# Patient Record
Sex: MALE | Race: WHITE
[De-identification: names, ages, dates, MRNs, and addresses within clinical notes are randomized per-mention and may not be internally consistent; named-entity substitution may affect disease eponyms.]

---

## 2019-07-04 ENCOUNTER — HOSPITAL ENCOUNTER (INPATIENT)
Dept: HOSPITAL 92 - ERS | Age: 84
LOS: 2 days | Discharge: HOME | DRG: 194 | End: 2019-07-06
Attending: FAMILY MEDICINE | Admitting: FAMILY MEDICINE
Payer: MEDICARE

## 2019-07-04 VITALS — BODY MASS INDEX: 21.6 KG/M2

## 2019-07-04 DIAGNOSIS — I12.9: ICD-10-CM

## 2019-07-04 DIAGNOSIS — R91.8: ICD-10-CM

## 2019-07-04 DIAGNOSIS — F02.80: ICD-10-CM

## 2019-07-04 DIAGNOSIS — Z87.891: ICD-10-CM

## 2019-07-04 DIAGNOSIS — J15.9: Primary | ICD-10-CM

## 2019-07-04 DIAGNOSIS — G30.9: ICD-10-CM

## 2019-07-04 DIAGNOSIS — D69.6: ICD-10-CM

## 2019-07-04 DIAGNOSIS — N18.9: ICD-10-CM

## 2019-07-04 DIAGNOSIS — J43.9: ICD-10-CM

## 2019-07-04 DIAGNOSIS — D63.1: ICD-10-CM

## 2019-07-04 DIAGNOSIS — N40.0: ICD-10-CM

## 2019-07-04 DIAGNOSIS — N17.9: ICD-10-CM

## 2019-07-04 LAB
ALBUMIN SERPL BCG-MCNC: 3.6 G/DL (ref 3.4–4.8)
ALP SERPL-CCNC: 72 U/L (ref 40–150)
ALT SERPL W P-5'-P-CCNC: 9 U/L (ref 8–55)
ANION GAP SERPL CALC-SCNC: 13 MMOL/L (ref 10–20)
AST SERPL-CCNC: 12 U/L (ref 5–34)
BASOPHILS # BLD AUTO: 0 THOU/UL (ref 0–0.2)
BASOPHILS NFR BLD AUTO: 0.3 % (ref 0–1)
BILIRUB SERPL-MCNC: 0.5 MG/DL (ref 0.2–1.2)
BUN SERPL-MCNC: 26 MG/DL (ref 8.4–25.7)
CALCIUM SERPL-MCNC: 8.9 MG/DL (ref 7.8–10.44)
CHLORIDE SERPL-SCNC: 107 MMOL/L (ref 98–107)
CK SERPL-CCNC: 62 U/L (ref 30–200)
CO2 SERPL-SCNC: 24 MMOL/L (ref 23–31)
CREAT CL PREDICTED SERPL C-G-VRATE: 0 ML/MIN (ref 70–130)
EOSINOPHIL # BLD AUTO: 0.1 THOU/UL (ref 0–0.7)
EOSINOPHIL NFR BLD AUTO: 2.6 % (ref 0–10)
GLOBULIN SER CALC-MCNC: 2.4 G/DL (ref 2.4–3.5)
GLUCOSE SERPL-MCNC: 111 MG/DL (ref 83–110)
HGB BLD-MCNC: 10.7 G/DL (ref 14–18)
IRON SERPL-MCNC: 45 UG/DL (ref 65–175)
LYMPHOCYTES # BLD: 0.7 THOU/UL (ref 1.2–3.4)
LYMPHOCYTES NFR BLD AUTO: 19.1 % (ref 21–51)
MCH RBC QN AUTO: 31.5 PG (ref 27–31)
MCV RBC AUTO: 91.7 FL (ref 78–98)
MONOCYTES # BLD AUTO: 0.3 THOU/UL (ref 0.11–0.59)
MONOCYTES NFR BLD AUTO: 9.8 % (ref 0–10)
NEUTROPHILS # BLD AUTO: 2.3 THOU/UL (ref 1.4–6.5)
NEUTROPHILS NFR BLD AUTO: 68.2 % (ref 42–75)
PLATELET # BLD AUTO: 106 THOU/UL (ref 130–400)
POTASSIUM SERPL-SCNC: 3.8 MMOL/L (ref 3.5–5.1)
RBC # BLD AUTO: 3.4 MILL/UL (ref 4.7–6.1)
SODIUM SERPL-SCNC: 140 MMOL/L (ref 136–145)
UIBC SERPL-MCNC: 220 MCG/DL (ref 261–462)
WBC # BLD AUTO: 3.4 THOU/UL (ref 4.8–10.8)

## 2019-07-04 PROCEDURE — 83550 IRON BINDING TEST: CPT

## 2019-07-04 PROCEDURE — 87040 BLOOD CULTURE FOR BACTERIA: CPT

## 2019-07-04 PROCEDURE — 83880 ASSAY OF NATRIURETIC PEPTIDE: CPT

## 2019-07-04 PROCEDURE — 96365 THER/PROPH/DIAG IV INF INIT: CPT

## 2019-07-04 PROCEDURE — 71250 CT THORAX DX C-: CPT

## 2019-07-04 PROCEDURE — 96367 TX/PROPH/DG ADDL SEQ IV INF: CPT

## 2019-07-04 PROCEDURE — 80048 BASIC METABOLIC PNL TOTAL CA: CPT

## 2019-07-04 PROCEDURE — 84484 ASSAY OF TROPONIN QUANT: CPT

## 2019-07-04 PROCEDURE — 82728 ASSAY OF FERRITIN: CPT

## 2019-07-04 PROCEDURE — 71045 X-RAY EXAM CHEST 1 VIEW: CPT

## 2019-07-04 PROCEDURE — 85025 COMPLETE CBC W/AUTO DIFF WBC: CPT

## 2019-07-04 PROCEDURE — 93005 ELECTROCARDIOGRAM TRACING: CPT

## 2019-07-04 PROCEDURE — 82607 VITAMIN B-12: CPT

## 2019-07-04 PROCEDURE — 84145 PROCALCITONIN (PCT): CPT

## 2019-07-04 PROCEDURE — 82747 ASSAY OF FOLIC ACID RBC: CPT

## 2019-07-04 PROCEDURE — 83540 ASSAY OF IRON: CPT

## 2019-07-04 PROCEDURE — 82550 ASSAY OF CK (CPK): CPT

## 2019-07-04 PROCEDURE — 80053 COMPREHEN METABOLIC PANEL: CPT

## 2019-07-04 PROCEDURE — 36415 COLL VENOUS BLD VENIPUNCTURE: CPT

## 2019-07-04 PROCEDURE — 96361 HYDRATE IV INFUSION ADD-ON: CPT

## 2019-07-04 NOTE — HP
I have examined the patient.  I have discussed the case with Dr. Dylan Jacinto and

agree with his assessment and plan. 



HISTORY OF PRESENT ILLNESS:  Briefly, Mr. Underwood is a pleasant 85-year-old white male

patient with an approximate greater than 50 pack-year history of smoking and a

history of "emphysema."  He states for the last several days he has just not felt

well and has noticed increased dyspnea on exertion as well as cough.  In the last 24

to 48 hours, he has developed a productive blood-tinged sputum, which prompted him

to call his daughter.  He was subsequently brought to our ER and noted to have

pneumonia and a parapneumonic effusion.  He is admitted for initial treatment of

pneumonia.  He has not had chills or fever. 



PHYSICAL EXAMINATION:

GENERAL:  He is awake and alert.  He appears to be in no acute distress. 

VITAL SIGNS:  His blood pressure is 167/60, his heart rate is 68 and regular,

respirations are 18 and not labored, he is afebrile, and his room air pulse oximetry

is 99%. 

EAR, NOSE, AND THROAT:  Mucous membranes slightly dry, but no exudate. 

NECK:  Supple. 

CARDIAC:  Heart rhythm is regular.  Distant heart sounds.  No gallop or murmur

noted. 

LUNGS:  Breath sounds are diminished.  No wheezing.  No use of accessory muscles. 

ABDOMEN:  Flat and soft without guarding, rebound, or rigidity. 

EXTREMITIES:  No edema. 

NEUROLOGIC:  No focal deficits.



LABORATORY DATA:  CBC; white count is 3400, his hemoglobin is 10.7, hematocrit is

31.2 with an MCV of 91.7.  His platelet count is reduced at 106,000.  His

differential appears to be normal. 



Chemistries; his sodium is 140, potassium 3.8, chloride is 107, bicarb is 24, BUN is

26, and creatinine is 1.45.  His glucose is 111.  Liver enzymes are normal.  His BNP

is indeterminate at 227.6.  His troponin is less than 0.01. 



IMAGING STUDIES:  Chest x-ray shows a multifocal bronchopneumonia with layering

parapneumonic effusion, all of this being on the left side.  There is also a small

right basilar opacity. 



ASSESSMENT:  Community-acquired pneumonia given his smoking history and need to be

concerned for malignancy. 



PLAN:  We will begin Rocephin and Zithromax.  We will order a CT of the chest.

Breathing treatments if needed, but at this point he is in no distress.  Further

testing depending on initial results. 







Job ID:  [id number]

## 2019-07-04 NOTE — PDOC.FPRHP
- History of Present Illness


Chief Complaint: Cough and hemoptysis 


History of Present Illness: 


CC: "He has pneumonia"





86 yo M w/ PMH of COPD, BPH, HTN presents with 2 day history of increasing HENDERSON, 

cough and hemoptysis. Pt denies any associated fever, chills, CP, NVDC. 





Patient's daughter is present at time of interview. She offers most of his 

medical history. She reports he is otherwise healthy, but notes that he 

complained of not feeling well the last couple of days. She denies any other 

history. 





No other complaints. 


ED Course: 





Rocephin, Azithromycin, 1L NS





- Allergies/Adverse Reactions


 Allergies











Allergy/AdvReac Type Severity Reaction Status Date / Time


 


No Known Drug Allergies Allergy   Verified 07/04/19 13:19














- Home Medications


 











 Medication  Instructions  Recorded  Confirmed  Type


 


Cholecalciferol (Vitamin D3) 1,000 mcg PO DAILY 07/04/19 07/04/19 History





[Vitamin D3]    


 


Cyanocobalamin (Vitamin B-12) 1,000 mcg PO DAILY 07/04/19 07/04/19 History





[Vitamin B-12]    


 


Donepezil HCl [Donepezil HCl ODT] 10 mg PO DAILY 07/04/19 07/04/19 History


 


Finasteride 5 mg PO DAILY 07/04/19 07/04/19 History


 


Lisinopril 20 mg PO DAILY 07/04/19 07/04/19 History














- History


PMHx: Alzheimer's dementia, COPD, HTN


 


PSHx: None





FHx: Non contributory


 


Social: Lives independently at home. Former Tobacco smoker. Denies alcohol and 

drug use. 


 








- Review of Systems


General: denies: fever/chills


ENT: denies: nasal congestion


Respiratory: reports: cough (w/ hemoptysis), shortness of breath.  denies: 

congestion


Cardiovascular: denies: chest pain, palpitation


Gastrointestinal: denies: nausea, vomiting, diarrhea


Genitourinary: denies: incontinence, dysuria


Skin: denies: rashes, lesions


Musculoskeletal: denies: pain, tenderness


Neurological: denies: numbness, syncope


Psychological: denies: anxiety, depression





- Vital signs


BP: 167/62  HR: 68 RR: 18 Tmax: 98.7 Pox: 99% on RmAir  Wt: 64 kg   








- Physical Exam


Constitutional: NAD, awake, alert and oriented


HEENT: normocephalic and atraumatic, PERRLA, normal nasal mucosa


-HEENT: 





dentures in place 


Neck: supple, FROM


Heart: RRR, normal S1/S2, no murmurs/rubs/gallops


Lungs: CTAB, no respiratory distress, good air movement, no wheezing


Abdomen: soft, non-tender, bowel sounds present, no masses/distention


Musculoskeletal: normal structure


Neurological: no focal deficit, CN II-XII intact


Skin: no rash/lesions, good turgor, capillary refill <2 seconds


Heme/Lymphatic: no unusual bruising or bleeding


Psychiatric: normal mood and affect





FMR H&P: Results





- Labs


Result Diagrams: 


 07/04/19 12:49





 07/04/19 12:49


Lab results: 


 











WBC  3.4 thou/uL (4.8-10.8)  L  07/04/19  12:49    


 


Hgb  10.7 g/dL (14.0-18.0)  L  07/04/19  12:49    


 


Hct  31.2 % (42.0-52.0)  L  07/04/19  12:49    


 


MCV  91.7 fL (78.0-98.0)   07/04/19  12:49    


 


Plt Count  106 thou/uL (130-400)  L  07/04/19  12:49    


 


Neutrophils %  68.2 % (42.0-75.0)   07/04/19  12:49    














- Radiology Interpretation


  ** Chest x-ray


Status: image reviewed by me (Multifocal bronchopneumonia with layering effusion

)





FMR H&P: A/P





- Problem List


(1) Community acquired bacterial pneumonia


Current Visit: Yes   Status: Acute   Code(s): J15.9 - UNSPECIFIED BACTERIAL 

PNEUMONIA   





(2) COPD (chronic obstructive pulmonary disease)


Current Visit: Yes   Status: Acute   





(3) HTN (hypertension)


Current Visit: Yes   Status: Acute   Code(s): I10 - ESSENTIAL (PRIMARY) 

HYPERTENSION   





(4) Alzheimer disease


Current Visit: Yes   Status: Acute   Code(s): G30.9 - ALZHEIMER'S DISEASE, 

UNSPECIFIED; F02.80 - DEMENTIA IN OTH DISEASES CLASSD ELSWHR W/O BEHAVRL 

DISTURB   





(5) Thrombocytopenia


Current Visit: Yes   Status: Acute   Code(s): D69.6 - THROMBOCYTOPENIA, 

UNSPECIFIED   





(6) BPH (benign prostatic hyperplasia)


Current Visit: Yes   Status: Acute   Code(s): N40.0 - BENIGN PROSTATIC 

HYPERPLASIA WITHOUT LOWER URINRY TRACT SYMP   





(7) ROBBIN (acute kidney injury)


Current Visit: Yes   Status: Acute   Code(s): N17.9 - ACUTE KIDNEY FAILURE, 

UNSPECIFIED   





- Plan





#. CAP


- Continue antibiotics


- IVF as needed


- Recommend pulmonary hygiene


- Will likely order CT scan due to CXR results. 





#. COPD


- Supportive care as needed 





#. Thrombocytopenia


- Unknown etiology


- Will monitor


- Suspected secondary to infection


- Will consider workup if persists





#ROBBIN vs CKD


- Unknown baseline


- Trend Creatinine


- IVF





#. BPH


- Continue home meds





#. HTN


- Continue home meds


- Monitor BP if needing to hold BP medication due to infection





[facility]: [facility]





CODE STATUS: FULL CODE





Disposition: Stable, will admit to Medical Floor for further evaluation and 

monitoring

## 2019-07-04 NOTE — CT
CT THORAX NONCONTRAST:



DATE:

7/4/2019



HISTORY:

85-year-old male with pneumonia. Cough and dyspnea. Hemoptysis.



COMPARISON:

None available



FINDINGS:

At basilar segments of left lower lobe, there are mixed interstitial and alveolar infiltrates. There 
is a 1.5 x 2 cm noncalcified pulmonary nodule at the lingula. This is presumably part of the

pneumonia, but neoplasm is not excluded. Therefore, follow-up is recommended. Diffuse, severe centril
obular emphysematous COPD changes throughout both lungs. No cardiomegaly. Atherosclerosis without

aneurysm of thoracic aorta. No pleural effusion or pneumothorax. Saber-sheath trachea.



IMPRESSION: 



1. Evidence for left lower lobe pneumonia involving basilar segments.

2. A 2 cm pulmonary nodule at lingula adjacent to the infiltrates. This is presumably part of the pne
umonia. Recommend follow-up noncontrast chest CT in a few weeks to rule out the possibility of lung

cancer.

3. Severe centrilobular emphysema.



Reported By: Yrn Barahona 

Electronically Signed:  7/4/2019 3:58 PM

## 2019-07-04 NOTE — RAD
XR Chest 1 View Portable



History: Dyspnea



Comparison: None.



Findings: Left lower lobe airspace opacity and small left effusion. Heart size is enlarged. Small rig
ht basilar opacity. No pneumothorax. No acute osseous abnormality.



Impression: Multifocal bronchopneumonia with layering parapneumonic effusion.



Reported By: Ismael Rowe 

Electronically Signed:  7/4/2019 12:51 PM

## 2019-07-05 LAB
ANION GAP SERPL CALC-SCNC: 11 MMOL/L (ref 10–20)
BASOPHILS # BLD AUTO: 0 THOU/UL (ref 0–0.2)
BASOPHILS NFR BLD AUTO: 0 % (ref 0–1)
BUN SERPL-MCNC: 25 MG/DL (ref 8.4–25.7)
CALCIUM SERPL-MCNC: 8.7 MG/DL (ref 7.8–10.44)
CHLORIDE SERPL-SCNC: 110 MMOL/L (ref 98–107)
CO2 SERPL-SCNC: 23 MMOL/L (ref 23–31)
CREAT CL PREDICTED SERPL C-G-VRATE: 37 ML/MIN (ref 70–130)
EOSINOPHIL # BLD AUTO: 0 THOU/UL (ref 0–0.7)
EOSINOPHIL NFR BLD AUTO: 0.8 % (ref 0–10)
GLUCOSE SERPL-MCNC: 101 MG/DL (ref 83–110)
HGB BLD-MCNC: 9.8 G/DL (ref 14–18)
LYMPHOCYTES # BLD: 0.4 THOU/UL (ref 1.2–3.4)
LYMPHOCYTES NFR BLD AUTO: 9.7 % (ref 21–51)
MCH RBC QN AUTO: 30.9 PG (ref 27–31)
MCV RBC AUTO: 91.9 FL (ref 78–98)
MONOCYTES # BLD AUTO: 0.4 THOU/UL (ref 0.11–0.59)
MONOCYTES NFR BLD AUTO: 9.2 % (ref 0–10)
NEUTROPHILS # BLD AUTO: 3.5 THOU/UL (ref 1.4–6.5)
NEUTROPHILS NFR BLD AUTO: 80.3 % (ref 42–75)
PLATELET # BLD AUTO: 107 THOU/UL (ref 130–400)
POTASSIUM SERPL-SCNC: 4.2 MMOL/L (ref 3.5–5.1)
RBC # BLD AUTO: 3.18 MILL/UL (ref 4.7–6.1)
SODIUM SERPL-SCNC: 140 MMOL/L (ref 136–145)
WBC # BLD AUTO: 4.4 THOU/UL (ref 4.8–10.8)

## 2019-07-05 RX ADMIN — CYANOCOBALAMIN TAB 1000 MCG SCH MCG: 1000 TAB at 08:04

## 2019-07-05 NOTE — PDOC.FM
- Subjective


Subjective: 





Mr. Underwood is doing well today.  He feels fine ambulating w/o oxygen.  He denies 

CP, SOB, hemoptysis this morning, fever, chills.





- Objective


Vital Signs & Weight: 


 Vital Signs (12 hours)











  Temp Pulse Resp BP Pulse Ox


 


 07/05/19 04:00  98.4 F  58 L  18  138/74  95


 


 07/05/19 00:05  98.5 F  60  16  148/70 H 


 


 07/04/19 20:00  98.7 F  58 L  18  156/78 H  96








 Weight











Weight                         64.592 kg














I&O: 


 











 07/03/19 07/04/19 07/05/19





 06:59 06:59 06:59


 


Intake Total   890


 


Balance   890











Result Diagrams: 


 07/05/19 04:21





 07/05/19 04:21





Phys Exam





- Physical Examination


Constitutional: NAD


HEENT: PERRLA, moist MMs


Neck: no nodes, no JVD, supple


Respiratory: no wheezing, no rhonchi


Rales present in right lung base


Cardiovascular: RRR, no significant murmur


Gastrointestinal: soft, no distention


Musculoskeletal: no edema, pulses present


Neurological: normal sensation, moves all 4 limbs


Lymphatic: no nodes


Skin: no rash





Dx/Plan


(1) Anemia


Code(s): D64.9 - ANEMIA, UNSPECIFIED   Status: Acute   





(2) ROBBIN (acute kidney injury)


Code(s): N17.9 - ACUTE KIDNEY FAILURE, UNSPECIFIED   Status: Acute   





(3) Alzheimer disease


Code(s): G30.9 - ALZHEIMER'S DISEASE, UNSPECIFIED; F02.80 - DEMENTIA IN OTH 

DISEASES CLASSD ELSWHR W/O BEHAVRL DISTURB   Status: Acute   





(4) BPH (benign prostatic hyperplasia)


Code(s): N40.0 - BENIGN PROSTATIC HYPERPLASIA WITHOUT LOWER URINRY TRACT SYMP   

Status: Acute   





(5) COPD (chronic obstructive pulmonary disease)


Status: Acute   





(6) Community acquired bacterial pneumonia


Code(s): J15.9 - UNSPECIFIED BACTERIAL PNEUMONIA   Status: Acute   





(7) HTN (hypertension)


Code(s): I10 - ESSENTIAL (PRIMARY) HYPERTENSION   Status: Acute   





(8) Thrombocytopenia


Code(s): D69.6 - THROMBOCYTOPENIA, UNSPECIFIED   Status: Acute   





- Plan


Plan: 





# CAP


CT Scan revealed bronchopneumonia possibly post-obstruction from 2 cm mass


- Continue ceftriaxone day 2/10, azithromycin day 2/10


- D/C fluids; good PO intake and no signs of intravascular depletion


- Recommend pulmonary hygiene





# COPD


No home medication


- Supportive care as needed 





# 1.5 x 2 cm Lung Mass


CT scan revealed non-calcified mass in lingula; mass possibly secondary to 

pneumonia but recommend follow up outpt with repeat CT scan.


Discussed with pt and daughter CT findings and plan for further care.  They are 

understanding at this point.


- Pulm referral as outpt to further assess mass





# Thrombocytopenia - stable


Possible etiology could be from 2 cm mass found on CT.  Further follow up will 

be needed to r/o a cancerous lesion


- Will monitor and follow up as outpt





# Anemia of Chronic Disease - stable


Possible etiology could be from 2 cm mass found on CT.  Further follow up will 

be needed to r/o a cancerous lesion


Hgb 9.8


Appears to be anemia of chronic disease; TIBC 220, Iron 45, Ferritin 402


- Will monitor 


- Pending B12, RBC Folate to further assess if macrocytic anemia is a factor in 

anemia





#ROBBIN vs CKD


- Unknown baseline


- Creatinine improving - 1.45 --> 1.34


- IVF





# BPH


- Continue home meds





# HTN


- Continue home meds





[facility]P: [facility]





CODE STATUS: FULL CODE





Disposition: Stable, will admit to Medical Floor for further evaluation and 

monitoring

## 2019-07-05 NOTE — PRG
DATE OF SERVICE:  07/05/2019



I have reviewed the note of Dr. Sherif Isaacs and agree with his assessment plan. 



Mr. Underwood was admitted yesterday with a pneumonia, which is currently being

adequately treated.  He was also found on CT to have a 2 cm pulmonary nodule and

there is a high suspicion for malignancy given his 50 to 100 pack-year history of

smoking.  For now, we will continue his coverage for pneumonia.  He already sees a

pulmonologist in [place], and we will likely have him follow up with his

pulmonologist there for further workup of the pulmonary nodule.  He also has an

anemia that appears to be an anemia of chronic disease.  We will continue in our

workup of this problem as well.  Clinically, Mr. Underwood looks and feels fine and has

no distress.  He is having no respiratory difficulty at all and in fact is quite

pleasant and alert, although perhaps slightly demented. 







Job ID:  [id number]

## 2019-07-06 VITALS — SYSTOLIC BLOOD PRESSURE: 165 MMHG | DIASTOLIC BLOOD PRESSURE: 62 MMHG | TEMPERATURE: 97.8 F

## 2019-07-06 LAB
ANION GAP SERPL CALC-SCNC: 11 MMOL/L (ref 10–20)
BASOPHILS # BLD AUTO: 0 THOU/UL (ref 0–0.2)
BASOPHILS NFR BLD AUTO: 0 % (ref 0–1)
BUN SERPL-MCNC: 22 MG/DL (ref 8.4–25.7)
CALCIUM SERPL-MCNC: 8.4 MG/DL (ref 7.8–10.44)
CHLORIDE SERPL-SCNC: 109 MMOL/L (ref 98–107)
CO2 SERPL-SCNC: 25 MMOL/L (ref 23–31)
CREAT CL PREDICTED SERPL C-G-VRATE: 34 ML/MIN (ref 70–130)
EOSINOPHIL # BLD AUTO: 0.1 THOU/UL (ref 0–0.7)
EOSINOPHIL NFR BLD AUTO: 4.2 % (ref 0–10)
GLUCOSE SERPL-MCNC: 89 MG/DL (ref 83–110)
HGB BLD-MCNC: 9.7 G/DL (ref 14–18)
LYMPHOCYTES # BLD: 0.7 THOU/UL (ref 1.2–3.4)
LYMPHOCYTES NFR BLD AUTO: 24.8 % (ref 21–51)
MCH RBC QN AUTO: 30.8 PG (ref 27–31)
MCV RBC AUTO: 92.5 FL (ref 78–98)
MONOCYTES # BLD AUTO: 0.4 THOU/UL (ref 0.11–0.59)
MONOCYTES NFR BLD AUTO: 13.6 % (ref 0–10)
NEUTROPHILS # BLD AUTO: 1.6 THOU/UL (ref 1.4–6.5)
NEUTROPHILS NFR BLD AUTO: 57.5 % (ref 42–75)
PLATELET # BLD AUTO: 110 THOU/UL (ref 130–400)
POTASSIUM SERPL-SCNC: 3.9 MMOL/L (ref 3.5–5.1)
RBC # BLD AUTO: 3.15 MILL/UL (ref 4.7–6.1)
SODIUM SERPL-SCNC: 141 MMOL/L (ref 136–145)
WBC # BLD AUTO: 2.9 THOU/UL (ref 4.8–10.8)

## 2019-07-06 RX ADMIN — CYANOCOBALAMIN TAB 1000 MCG SCH MCG: 1000 TAB at 08:02

## 2019-07-06 NOTE — PRG
DATE OF SERVICE:  



Mr. Underwood continues to look and feel better.  He is continuing antibiotics and we

will transition him to oral antibiotics in a day or two in anticipation of possibly

discharging him by Monday.  We have consulted Pulmonary for his pulmonary nodule.

He did in fact not have a pulmonary doctor in [place], [place].  Clinically much

improved with plan as above. 







Job ID:  [id number]

## 2019-07-06 NOTE — PDOC.FM
- Subjective


Subjective: 





Mr. Underwood is doing well today. He remains with a rustic sputum on cough.  He 

states he has little SOB but otherwise has no complaints.  He is eating/

drinking well.  He denies fever, chills, chest pain, palpitations, gi upset.





- Objective


Vital Signs & Weight: 


 Vital Signs (12 hours)











  Temp Pulse Resp BP BP Pulse Ox


 


 07/05/19 23:39   57 L    159/73 H  95


 


 07/05/19 21:00  98.3 F  60  20  180/71 H   96


 


 07/05/19 20:00       96








 Weight











Weight                         64.592 kg














I&O: 


 











 07/04/19 07/05/19 07/06/19





 06:59 06:59 06:59


 


Intake Total  890 


 


Balance  890 











Result Diagrams: 


 07/06/19 06:44





 07/06/19 06:44





Phys Exam





- Physical Examination


HEENT: PERRLA, moist MMs


Neck: no JVD, full ROM


Respiratory: no wheezing, no rales, no rhonchi, clear to auscultation bilateral


Cardiovascular: RRR, no significant murmur


Gastrointestinal: soft, positive bowel sounds


Musculoskeletal: no edema, pulses present


Psychiatric: A&O x 3





Dx/Plan


(1) Anemia


Code(s): D64.9 - ANEMIA, UNSPECIFIED   Status: Acute   





(2) ROBBIN (acute kidney injury)


Code(s): N17.9 - ACUTE KIDNEY FAILURE, UNSPECIFIED   Status: Acute   





(3) Alzheimer disease


Code(s): G30.9 - ALZHEIMER'S DISEASE, UNSPECIFIED; F02.80 - DEMENTIA IN OTH 

DISEASES CLASSD ELSWHR W/O BEHAVRL DISTURB   Status: Acute   





(4) BPH (benign prostatic hyperplasia)


Code(s): N40.0 - BENIGN PROSTATIC HYPERPLASIA WITHOUT LOWER URINRY TRACT SYMP   

Status: Acute   





(5) COPD (chronic obstructive pulmonary disease)


Status: Acute   





(6) Community acquired bacterial pneumonia


Code(s): J15.9 - UNSPECIFIED BACTERIAL PNEUMONIA   Status: Acute   





(7) HTN (hypertension)


Code(s): I10 - ESSENTIAL (PRIMARY) HYPERTENSION   Status: Acute   





(8) Thrombocytopenia


Code(s): D69.6 - THROMBOCYTOPENIA, UNSPECIFIED   Status: Acute   





- Plan


Plan: 


# CAP


CT Scan revealed bronchopneumonia possibly post-obstruction from 2 cm mass


- Continue ceftriaxone day 3/10, azithromycin day 3/3  --> convert to cefdinir 

PO on discharge


- D/C fluids; good PO intake and no signs of intravascular depletion


- Recommend pulmonary hygiene





# COPD


No home medication


- Supportive care as needed 





# 1.5 x 2 cm Lung Mass


CT scan revealed non-calcified mass in lingula; mass possibly secondary to 

pneumonia but recommend follow up outpt with repeat CT scan.


Discussed with pt and daughter CT findings and plan for further care.  They are 

understanding at this point.


- Pulm referral as outpt to further assess mass





# Thrombocytopenia - stable


Possible etiology could be from 2 cm mass found on CT.  Further follow up will 

be needed to r/o a cancerous lesion


- Will monitor and follow up as outpt





# Anemia of Chronic Disease - stable


Possible etiology could be from 2 cm mass found on CT.  Further follow up will 

be needed to r/o a cancerous lesion


Hgb 9.8


Appears to be anemia of chronic disease; TIBC 220, Iron 45, Ferritin 402


- Will monitor 


- Pending B12, RBC Folate to further assess if macrocytic anemia is a factor in 

anemia





#ROBBIN vs CKD


- Unknown baseline


- Creatinine improving - 1.45 --> 1.34 --> 1.45


- encourage PO intake





# BPH


- Continue home meds





# HTN


- Continue home meds


- follow up as outpt for mild elevation





[facility]P: [facility]





CODE STATUS: FULL CODE





Disposition: Stable, discharge today with 10 day abx course. Pulm followup for 

mass as outpt and PCP follow up for anemia.

## 2019-07-06 NOTE — EKG
Test Reason : SOB

Blood Pressure : ***/*** mmHG

Vent. Rate : 069 BPM     Atrial Rate : 069 BPM

   P-R Int : 164 ms          QRS Dur : 102 ms

    QT Int : 416 ms       P-R-T Axes : 067 067 073 degrees

   QTc Int : 445 ms

 

Sinus rhythm with frequent Premature ventricular complexes

T wave abnormality, consider anterior ischemia

U-wave

Abnormal ECG

 

Confirmed by EDWARD GONZALES, MAHESH HALEY (9),  NEVILLE MONTOYA ([id number]) on 7/6/2019 8:37:28 PM

 

Referred By:             Confirmed By:MAHESH LEON MD

## 2019-07-08 LAB
FOLATE RBC-MCNC: 1126 NG/ML (ref 498–?)
HCT VFR BLD CALC: 34 % (ref 37.5–51)

## 2019-07-08 NOTE — DIS
DATE OF ADMISSION:  07/04/2019



DATE OF DISCHARGE:  07/06/2019



DISCHARGING PHYSICIAN:  Juan Moreno MD



RESIDENT:  Sherif Isaacs DO



CONSULTATIONS:  None.



PROCEDURES:  

1. Chest x-ray on 07/04/2019, revealed multifocal bronchopneumonia with layered

parapneumonic effusion. 

2. Chest CT on 07/04/2019, revealed evidence for left lower lobe pneumonia 
involving

basilar segments.  A 2 cm pulmonary nodule at lingula adjacent to the 
infiltrates.

This is presumably part of the pneumonia.  Recommended followup noncontrast CT 
in a

few weeks to rule out the possibility of lung cancer.  Severe central lobar

emphysema. 



PRIMARY DIAGNOSES:  

1. Community-acquired pneumonia.

2. Chronic obstructive pulmonary disease.

3. Thrombocytopenia.

4. Acute kidney injury versus chronic kidney disease.



SECONDARY DIAGNOSES:  

1. Benign prostatic hyperplasia.

2. Hypertension.

3. Pulmonary nodule.



DISCHARGE MEDICATIONS:  

1. Lisinopril 20 mg p.o. daily.

2. Finasteride 5 mg p.o. daily.

3. Donepezil 10 mg p.o. at bedtime.

4. Vitamin B12 of 1000 mcg p.o. daily.

5. Vitamin D3 of 1000 mcg p.o. daily.

6. Azithromycin 500 mg p.o. daily.

7. Cefdinir 300 mg p.o. b.i.d.



DISCONTINUED MEDICATIONS:  None.



HISTORY OF PRESENT ILLNESS AND HOSPITAL COURSE:  Mr. Underwood is an 85-year-old 
male

with past medical history significant for COPD and hypertension, who presented 
with

2-day history of increasing dyspnea on exertion, cough, and hemoptysis.  He 
denied

any associated fever, chills, chest pain, nausea, vomiting, diarrhea, or

constipation.  In the ED, he was started on Rocephin and azithromycin and

resuscitated with 1 L of normal saline fluid.  He was started on antibiotics for

community-acquired pneumonia, found on chest x-ray, confirmed with a CT scan.  
He

was found to have a low white blood cell count of 3.4.  He also needed new 
oxygen

requirement.  He was found to have an ROBBIN with creatinine at 1.45, which 
decreased a

little bit with fluid resuscitation, but this might be his baseline.  We are 
unsure

what his baseline is due to no past records. 



Of note, he was found to have a 1.5 x 2 cm lung mass on a CT scan.  There is a

noncalcified mass in the ligula.  It is possible the mass was secondary to

pneumonia, but it is recommended he follow up with repeat CT scan.  This was

discussed with the patient and his daughter at bedside.  He was also found to be

thrombocytopenic and to have anemia of chronic disease.  It is possible the 
thrombocytopenia and anemia are secondary to cancer. 



On a second day of admission, he looked like he was in no distress and no longer

needed an oxygen requirement.  We opted to keep him one more night due to his 
age

and just overall presentation when he came in.  We wanted to give one more day 
of IV

antibiotics. 



On a third day, he continued to improve, and so we discharged him with a 10-day

course of cefdinir and he finished his azithromycin that day. 



DISPOSITION:  Stable.



DISCHARGE INSTRUCTIONS:  

1. Location:  [facility].

2. Diet:  Heart-healthy diet, renal protective diet.

3. Activity:  Ad bobby.

4. Followup:  Follow up with primary care provider in 5 to 7 days, [facility].

Pulmonary followup for mass as an outpatient, pulmonary consult placed.







Job ID:  [id number]



LARS

## 2019-07-23 ENCOUNTER — HOSPITAL ENCOUNTER (OUTPATIENT)
Dept: HOSPITAL 92 - RAD | Age: 84
Discharge: HOME | End: 2019-07-23
Attending: INTERNAL MEDICINE
Payer: MEDICARE

## 2019-07-23 DIAGNOSIS — R06.00: Primary | ICD-10-CM

## 2019-07-23 DIAGNOSIS — R91.8: ICD-10-CM

## 2019-07-23 DIAGNOSIS — J18.1: ICD-10-CM

## 2019-07-23 PROCEDURE — 71046 X-RAY EXAM CHEST 2 VIEWS: CPT

## 2019-07-23 NOTE — RAD
RADIOGRAPH CHEST 2 VIEWS:

 

Date: 7/23/19

Time: 1:00 p.m.

 

HISTORY: 

85-year-old male with dyspnea. 

 

COMPARISON: 

Single view study of 7/4/19. 

 

FINDINGS:

The previously demonstrated infiltrate at the left lung base, has almost resolved, except for a small
 residual component at the lingula. It is uncertain whether this represents residual active pneumonia
 or represents post pneumonic scar.  There is no pleural effusion. There is hyperinflation consistent
 with COPD. No cardiomegaly or mediastinal widening. No hilar enlargement. No new infiltrate. No pulm
onary edema or pneumothorax. 

 

IMPRESSION:

1.      Centrilobular emphysema. 

2.      Interval resolution of previously demonstrated infiltrates, except for mild residual density 
in the lingula currently, which could either represent post pneumonic pulmonary scar or mild residual
 active pneumonia. Recommend continued follow-up.

 

 

JN []

 

POS: CET